# Patient Record
Sex: MALE | Race: BLACK OR AFRICAN AMERICAN | Employment: OTHER | URBAN - METROPOLITAN AREA
[De-identification: names, ages, dates, MRNs, and addresses within clinical notes are randomized per-mention and may not be internally consistent; named-entity substitution may affect disease eponyms.]

---

## 2018-06-30 ENCOUNTER — HOSPITAL ENCOUNTER (EMERGENCY)
Age: 28
Discharge: STILL ADMITTED OR ADMINISTRATIVE | End: 2018-06-30
Attending: EMERGENCY MEDICINE
Payer: SELF-PAY

## 2018-06-30 VITALS
TEMPERATURE: 99.3 F | DIASTOLIC BLOOD PRESSURE: 89 MMHG | RESPIRATION RATE: 17 BRPM | HEART RATE: 92 BPM | OXYGEN SATURATION: 99 % | SYSTOLIC BLOOD PRESSURE: 127 MMHG

## 2018-06-30 PROCEDURE — 75810000275 HC EMERGENCY DEPT VISIT NO LEVEL OF CARE

## 2018-06-30 RX ORDER — ONDANSETRON 2 MG/ML
4 INJECTION INTRAMUSCULAR; INTRAVENOUS
Status: DISCONTINUED | OUTPATIENT
Start: 2018-06-30 | End: 2018-06-30

## 2018-06-30 NOTE — ED NOTES
Patient given phone to call mother, patient able to talk in complete sentences while on the phone. Patient able to ambulate independently. patient continues to refuse treatment, provider made aware, will arrive in error as no treatment was begun

## 2018-06-30 NOTE — ED NOTES
Patient sleeping in bed 12 when this nurse went into the room to introduce herself. Patient difficult to arouse, incomprehensible words, eyes are blood shot.  Informed the patient that a tech was going to go into the room to obtain blood work

## 2018-06-30 NOTE — ED NOTES
I performed a brief evaluation, including history and physical, of the patient here in triage and I have determined that pt will need further treatment and evaluation from the main side ER physician. I have placed initial orders to help in expediting patients care. June 30, 2018 at 3:05 PM - Barb Murray NP        There were no vitals taken for this visit. Patient found wondering on 3rd floor, would not answer many questions in triage, reports abdominal pain starting today, with nausea and vomiting.   Wine cooler fell out of his bag

## 2018-06-30 NOTE — ED NOTES
This nurse spoke with the mother of the patient after the patient gave us verbal permission to talk with her and gave her phone number to the ED tech. Mother states the family lives in [place], including the patient, states she has been trying to get him home for a few days. Mother suspects the patient has a drug problem.

## 2018-06-30 NOTE — ED TRIAGE NOTES
Told nurse to stop talking. Refusing to answer questions. Asked why he was in hospital , says I don't want to go into that. Registration states he was found wandering on 3rd floor. Poor body hygeine. Wine cooler fell out of pt's grocery bag. Pt says started feeling bad today.

## 2018-06-30 NOTE — ED NOTES
Patient refusing treatment, does not want any tests done, will have the ED tech walk the patient out.

## 2018-06-30 NOTE — ED NOTES
Pt was asleep in chair when called. Had to nudge him to wake up. Walked pt back to room. Asked if IV could be started and blood obtained. Pt stated that he will not give blood \"I am to weak\". When asked what he expects to be done for him if he refuses to allow us to obtain the blood needed to figure out what is wrong he says \"I don't know. \" Pt asked for heat to be turned up and more blankets. Returned to room to give pt blanket and pt was asleep in the cot.